# Patient Record
Sex: FEMALE | Race: WHITE | HISPANIC OR LATINO | URBAN - METROPOLITAN AREA
[De-identification: names, ages, dates, MRNs, and addresses within clinical notes are randomized per-mention and may not be internally consistent; named-entity substitution may affect disease eponyms.]

---

## 2017-12-17 ENCOUNTER — HOSPITAL ENCOUNTER (OUTPATIENT)
Facility: HOSPITAL | Age: 62
Discharge: HOME | End: 2017-12-18
Attending: EMERGENCY MEDICINE | Admitting: EMERGENCY MEDICINE

## 2017-12-17 DIAGNOSIS — S06.0XAA CONCUSSION: ICD-10-CM

## 2017-12-17 DIAGNOSIS — N30.00 ACUTE CYSTITIS WITHOUT HEMATURIA: ICD-10-CM

## 2017-12-17 DIAGNOSIS — G93.89 BRAIN MASS: ICD-10-CM

## 2017-12-17 DIAGNOSIS — F07.81 POST CONCUSSIVE SYNDROME: Primary | ICD-10-CM

## 2017-12-17 DIAGNOSIS — M54.50 LOW BACK PAIN: ICD-10-CM

## 2017-12-17 DIAGNOSIS — M54.6 THORACIC BACK PAIN: ICD-10-CM

## 2017-12-17 LAB
ALBUMIN SERPL BCP-MCNC: 3.5 G/DL
ALP SERPL-CCNC: 101 U/L
ALT SERPL W/O P-5'-P-CCNC: 29 U/L
ANION GAP SERPL CALC-SCNC: 9 MMOL/L
AST SERPL-CCNC: 16 U/L
BASOPHILS # BLD AUTO: 0.03 K/UL
BASOPHILS NFR BLD: 0.3 %
BILIRUB SERPL-MCNC: 0.2 MG/DL
BUN SERPL-MCNC: 16 MG/DL
CALCIUM SERPL-MCNC: 9.1 MG/DL
CHLORIDE SERPL-SCNC: 105 MMOL/L
CO2 SERPL-SCNC: 24 MMOL/L
CREAT SERPL-MCNC: 0.7 MG/DL
DIFFERENTIAL METHOD: NORMAL
EOSINOPHIL # BLD AUTO: 0.1 K/UL
EOSINOPHIL NFR BLD: 1 %
ERYTHROCYTE [DISTWIDTH] IN BLOOD BY AUTOMATED COUNT: 13.6 %
EST. GFR  (AFRICAN AMERICAN): >60 ML/MIN/1.73 M^2
EST. GFR  (NON AFRICAN AMERICAN): >60 ML/MIN/1.73 M^2
GLUCOSE SERPL-MCNC: 98 MG/DL
HCT VFR BLD AUTO: 42 %
HGB BLD-MCNC: 14.6 G/DL
INR PPP: 0.9
LYMPHOCYTES # BLD AUTO: 2.9 K/UL
LYMPHOCYTES NFR BLD: 26.6 %
MCH RBC QN AUTO: 30 PG
MCHC RBC AUTO-ENTMCNC: 34.8 G/DL
MCV RBC AUTO: 86 FL
MONOCYTES # BLD AUTO: 0.7 K/UL
MONOCYTES NFR BLD: 6.2 %
NEUTROPHILS # BLD AUTO: 7.3 K/UL
NEUTROPHILS NFR BLD: 65.9 %
PLATELET # BLD AUTO: 293 K/UL
PMV BLD AUTO: 10.8 FL
POTASSIUM SERPL-SCNC: 4 MMOL/L
PROT SERPL-MCNC: 7.5 G/DL
PROTHROMBIN TIME: 10 SEC
RBC # BLD AUTO: 4.87 M/UL
SODIUM SERPL-SCNC: 138 MMOL/L
WBC # BLD AUTO: 10.99 K/UL

## 2017-12-17 PROCEDURE — 85610 PROTHROMBIN TIME: CPT

## 2017-12-17 PROCEDURE — 99285 EMERGENCY DEPT VISIT HI MDM: CPT | Mod: 25

## 2017-12-17 PROCEDURE — G0378 HOSPITAL OBSERVATION PER HR: HCPCS

## 2017-12-17 PROCEDURE — 96375 TX/PRO/DX INJ NEW DRUG ADDON: CPT

## 2017-12-17 PROCEDURE — 85025 COMPLETE CBC W/AUTO DIFF WBC: CPT

## 2017-12-17 PROCEDURE — 25500020 PHARM REV CODE 255: Performed by: EMERGENCY MEDICINE

## 2017-12-17 PROCEDURE — A4216 STERILE WATER/SALINE, 10 ML: HCPCS | Performed by: EMERGENCY MEDICINE

## 2017-12-17 PROCEDURE — 80053 COMPREHEN METABOLIC PANEL: CPT

## 2017-12-17 PROCEDURE — 25000003 PHARM REV CODE 250: Performed by: EMERGENCY MEDICINE

## 2017-12-17 PROCEDURE — 63600175 PHARM REV CODE 636 W HCPCS: Performed by: EMERGENCY MEDICINE

## 2017-12-17 PROCEDURE — A9585 GADOBUTROL INJECTION: HCPCS | Performed by: EMERGENCY MEDICINE

## 2017-12-17 PROCEDURE — 96374 THER/PROPH/DIAG INJ IV PUSH: CPT

## 2017-12-17 RX ORDER — GADOBUTROL 604.72 MG/ML
7.5 INJECTION INTRAVENOUS
Status: COMPLETED | OUTPATIENT
Start: 2017-12-17 | End: 2017-12-17

## 2017-12-17 RX ORDER — PANTOPRAZOLE SODIUM 40 MG/1
40 TABLET, DELAYED RELEASE ORAL DAILY
Status: DISCONTINUED | OUTPATIENT
Start: 2017-12-18 | End: 2017-12-18 | Stop reason: HOSPADM

## 2017-12-17 RX ORDER — MORPHINE SULFATE 8 MG/ML
4 INJECTION INTRAMUSCULAR; INTRAVENOUS; SUBCUTANEOUS
Status: COMPLETED | OUTPATIENT
Start: 2017-12-17 | End: 2017-12-17

## 2017-12-17 RX ORDER — ACETAMINOPHEN 325 MG/1
650 TABLET ORAL EVERY 8 HOURS PRN
Status: DISCONTINUED | OUTPATIENT
Start: 2017-12-17 | End: 2017-12-18 | Stop reason: HOSPADM

## 2017-12-17 RX ORDER — ONDANSETRON 2 MG/ML
4 INJECTION INTRAMUSCULAR; INTRAVENOUS
Status: COMPLETED | OUTPATIENT
Start: 2017-12-17 | End: 2017-12-17

## 2017-12-17 RX ORDER — SODIUM CHLORIDE 0.9 % (FLUSH) 0.9 %
3 SYRINGE (ML) INJECTION EVERY 8 HOURS
Status: DISCONTINUED | OUTPATIENT
Start: 2017-12-17 | End: 2017-12-18 | Stop reason: HOSPADM

## 2017-12-17 RX ORDER — OXYCODONE HYDROCHLORIDE 5 MG/1
5 TABLET ORAL EVERY 4 HOURS PRN
Status: DISCONTINUED | OUTPATIENT
Start: 2017-12-17 | End: 2017-12-18 | Stop reason: HOSPADM

## 2017-12-17 RX ADMIN — GADOBUTROL 7.5 ML: 604.72 INJECTION INTRAVENOUS at 06:12

## 2017-12-17 RX ADMIN — SODIUM CHLORIDE, PRESERVATIVE FREE 3 ML: 5 INJECTION INTRAVENOUS at 10:12

## 2017-12-17 RX ADMIN — OXYCODONE HYDROCHLORIDE 5 MG: 5 TABLET ORAL at 08:12

## 2017-12-17 RX ADMIN — MORPHINE SULFATE 4 MG: 8 INJECTION, SOLUTION INTRAMUSCULAR; INTRAVENOUS at 02:12

## 2017-12-17 RX ADMIN — ONDANSETRON 4 MG: 2 INJECTION, SOLUTION INTRAMUSCULAR; INTRAVENOUS at 02:12

## 2017-12-17 NOTE — ED PROVIDER NOTES
Encounter Date: 2017    SCRIBE #1 NOTE: I, Marianna Wilburn, am scribing for, and in the presence of,  Demetrius Markham MD. I have scribed the following portions of the note - Other sections scribed: HPI and ROS.       History     Chief Complaint   Patient presents with    Fall     slip and fall. Hit head; EMS reports pt does not take any blood thinners     CC: Fall    HPI: This 62 y.o. Female with PMHx of gastritis, high cholesterol, abdominal hernia, and  section presents to the ED for an evaluation following a fall that occurred approximately 2 hours ago. Pt states she slipped on water and her head hit a rock. Pt reports LOC subsequent to the fall. Pt states she did not know where she was and who she was with following the fall. Pt reports nausea, severe back pain, severe neck pain, severe headache, and weakness to hands and feet following the fall. Pt denies a hx of DM. Pt also denies any other associated symptoms.       The history is provided by the patient. The history is limited by a language barrier. A  was used ([place] ([place])).     Review of patient's allergies indicates:   Allergen Reactions    Penicillins Hives     Past Medical History:   Diagnosis Date    Abdominal hernia     Arthritis     Constipation     Gastritis     High cholesterol     History of  section     UTI (urinary tract infection)      Past Surgical History:   Procedure Laterality Date    TUBAL LIGATION       History reviewed. No pertinent family history.  Social History   Substance Use Topics    Smoking status: Never Smoker    Smokeless tobacco: Never Used    Alcohol use No     Review of Systems   Constitutional: Negative for chills, diaphoresis and fever.   HENT: Negative for ear pain and sore throat.         (+) Head trauma   Eyes: Negative for pain.   Respiratory: Negative for cough and shortness of breath.    Cardiovascular: Negative for chest pain.   Gastrointestinal: Positive  for nausea. Negative for abdominal pain, diarrhea and vomiting.   Genitourinary: Negative for dysuria.   Musculoskeletal: Positive for back pain (severe) and neck pain (severe).   Skin: Negative for rash.   Neurological: Positive for weakness (to hands and feet ) and headaches (severe).        (+) LOC   Psychiatric/Behavioral: Positive for confusion.       Physical Exam     Initial Vitals [12/17/17 1344]   BP Pulse Resp Temp SpO2   (!) 153/86 89 18 98.3 °F (36.8 °C) 95 %      MAP       108.33         Physical Exam    Nursing note and vitals reviewed.  Constitutional: She appears well-developed and well-nourished. No distress.   Eyes: EOM are normal. Pupils are equal, round, and reactive to light.   Neck: Normal range of motion. Neck supple. No thyromegaly present. No JVD present.   Cardiovascular: Normal rate, regular rhythm, normal heart sounds and intact distal pulses. Exam reveals no gallop and no friction rub.    No murmur heard.  Pulmonary/Chest: Breath sounds normal. No respiratory distress. She has no wheezes. She has no rhonchi. She has no rales. She exhibits no tenderness.   Abdominal: Soft. Bowel sounds are normal. She exhibits no distension. There is no tenderness. There is no rebound and no guarding.   Musculoskeletal: Normal range of motion. She exhibits no edema or tenderness.   Neurological: She is alert and oriented to person, place, and time. She has normal strength. She displays normal reflexes. No cranial nerve deficit or sensory deficit.   Skin: Skin is warm and dry.         ED Course   Procedures  Labs Reviewed   CBC W/ AUTO DIFFERENTIAL   COMPREHENSIVE METABOLIC PANEL   PROTIME-INR        Patient had weakness and fall. Found to have a UTI. Also a lesion in the brain on ct head. Will obs as syncope vs seizure vs mechanical fall is not certain. Will obtain MRI brain to further assess. Discussed with NP on Obs service.                 Scribe Attestation:   Scribe #1: I performed the above scribed  service and the documentation accurately describes the services I performed. I attest to the accuracy of the note.    Attending Attestation:           Physician Attestation for Scribe:  Physician Attestation Statement for Scribe #1: I, Demetrius Markham MD, reviewed documentation, as scribed by Marianna Wilburn in my presence, and it is both accurate and complete.                 ED Course      Clinical Impression:   The primary encounter diagnosis was Post concussive syndrome. Diagnoses of Low back pain, Thoracic back pain, Concussion, Brain mass, and Acute cystitis without hematuria were also pertinent to this visit.                           Demetrius Markham MD  01/17/18 9403

## 2017-12-17 NOTE — ED TRIAGE NOTES
Family states that pt. Is visiting, pt. Slipped in the door way and hit her head on a rock. Family reports that pt. Did lose consciousness for about 40 minutes, pt. Wasn't responding to voice our touch. Pt. Reports she has a headache, pt. States she did not remember what happened, she just remembers having the headache after the fall. Pt. Reports she is experiencing some nausea but no vomiting. Pt. Is awake and alert, and in no acute distress. Family at the bedside.

## 2017-12-18 VITALS
DIASTOLIC BLOOD PRESSURE: 73 MMHG | BODY MASS INDEX: 32.3 KG/M2 | SYSTOLIC BLOOD PRESSURE: 116 MMHG | RESPIRATION RATE: 18 BRPM | OXYGEN SATURATION: 93 % | WEIGHT: 182.31 LBS | HEART RATE: 77 BPM | TEMPERATURE: 98 F | HEIGHT: 63 IN

## 2017-12-18 LAB
BACTERIA #/AREA URNS HPF: ABNORMAL /HPF
BILIRUB UR QL STRIP: NEGATIVE
CLARITY UR: CLEAR
COLOR UR: YELLOW
GLUCOSE UR QL STRIP: NEGATIVE
HGB UR QL STRIP: NEGATIVE
KETONES UR QL STRIP: NEGATIVE
LEUKOCYTE ESTERASE UR QL STRIP: ABNORMAL
MICROSCOPIC COMMENT: ABNORMAL
NITRITE UR QL STRIP: POSITIVE
PH UR STRIP: 5 [PH] (ref 5–8)
PROT UR QL STRIP: NEGATIVE
SP GR UR STRIP: 1.02 (ref 1–1.03)
URN SPEC COLLECT METH UR: ABNORMAL
UROBILINOGEN UR STRIP-ACNC: NEGATIVE EU/DL
WBC #/AREA URNS HPF: 10 /HPF (ref 0–5)

## 2017-12-18 PROCEDURE — G0378 HOSPITAL OBSERVATION PER HR: HCPCS

## 2017-12-18 PROCEDURE — 99218 PR INITIAL OBSERVATION CARE,LEVL I: CPT | Mod: ,,, | Performed by: PSYCHIATRY & NEUROLOGY

## 2017-12-18 PROCEDURE — 81000 URINALYSIS NONAUTO W/SCOPE: CPT

## 2017-12-18 PROCEDURE — A4216 STERILE WATER/SALINE, 10 ML: HCPCS | Performed by: EMERGENCY MEDICINE

## 2017-12-18 PROCEDURE — 25000003 PHARM REV CODE 250: Performed by: EMERGENCY MEDICINE

## 2017-12-18 RX ORDER — CIPROFLOXACIN 500 MG/1
500 TABLET ORAL EVERY 12 HOURS
Qty: 14 TABLET | Refills: 0 | Status: SHIPPED | OUTPATIENT
Start: 2017-12-18 | End: 2017-12-25

## 2017-12-18 RX ADMIN — SODIUM CHLORIDE, PRESERVATIVE FREE 3 ML: 5 INJECTION INTRAVENOUS at 05:12

## 2017-12-18 RX ADMIN — ACETAMINOPHEN 650 MG: 325 TABLET ORAL at 09:12

## 2017-12-18 RX ADMIN — OXYCODONE HYDROCHLORIDE 5 MG: 5 TABLET ORAL at 06:12

## 2017-12-18 RX ADMIN — PANTOPRAZOLE SODIUM 40 MG: 40 TABLET, DELAYED RELEASE ORAL at 09:12

## 2017-12-18 NOTE — HPI
PHIL Andrews is a Urdu speaking 62 y.o. female, history and assessment obtained through the language line, with HLD presented to the ED for evaluation of a fall. Patient reports she [place] the country [place] her daughter when she slipped and fell backwards hitting her head on a rock. Se further report when she woke up her head was hurting. She states she was out ~ .5 hour, family kept her in the house for about an 1 hour before bringing her to the hospital because she states they did not believe she fell, but someone witnessed the fall, that's when they decided to bring her to the emergency room. She denies chest pain, abdominal pain, NVD, focal deficit, seizures, gait abnormalities, dizziness, lightheadedness or any other associated symptoms. In the ED, CT head completed and showed extracranial soft tissue swelling without evidence of underlying fracture or acute intracranial hemorrhage; 1.5 cm low-density lesion in the frontal horn the left lateral ventricle. CT cervical spine without acute findings. Xray lumbar spine shows L5 pars defects with grade 1 anterolisthesis of line 5 on S1 with severe DJD at that level. CXR without acute findings. MRI brain shows 1.6 x 0.8 cm T1 hypointense/T2 hyperintense nonenhancing lesion in the frontal horn of the left lateral ventricle with minimal edema of the surrounding periventricular white matter; no obstructive signs. MRI cervical spine without acute findings. CBC and chemistry unremarkable. Admitted to OBS for overnight monitoring

## 2017-12-18 NOTE — HOSPITAL COURSE
Patient presents s/p traumatic fall with post concussive syndrome. Initially confused following incident. CT revealing soft tissue swelling. Placed in observation overnight for neuro checks and neurology consult. MRI rom and C-spine with no acute abnormalities. On MRI brain there was a small mass projecting to left lateral ventricle. This is a chronic finding of which patient already aware and per neurology for now seems to be of no danger (see MRI official report). She will need to follow for serial imaging yearly upon returning to [place] which was discussed with patient. This am she was with complaint of dysuria and odor thus UA obtained revealing nitrite positive UTI. She has history of recurrent UTI following perforated bladder with c section years ago. Will start on cipro x7 days. Patient to discharge to home today with family.

## 2017-12-18 NOTE — PLAN OF CARE
12/18/17 1332   Final Note   Assessment Type Final Discharge Note   Discharge Disposition Home   What phone number can be called within the next 1-3 days to see how you are doing after discharge? ([phone])   Hospital Follow Up  Appt(s) scheduled? No  (daughter to schedule)   Discharge plans and expectations educations in teach back method with documentation complete? Yes   Right Care Referral Info   Post Acute Recommendation No Care   Patient stated that daughter takes her to a clinic called [facility] and that is where they get her Rx filled also.  She stated that daughter will schedule appointment.    NurseAtul notified all CM needs are met.

## 2017-12-18 NOTE — PLAN OF CARE
"Assessment completed using language line  [id number]    TN to patient's room to discuss Helping the patient manage care at home.   TN/MATTI roll explained to pt.  TN's name and contact info placed on white board.  Pt/family encouraged to call for any problems/concerns with DC. "Discharge planning begins on Admission" pamphlet discussed and placed in "My Health Packet" and placed at bedside.     Preferred Appointment time / days: Daughter to schedule  Preferred pharmacy: Uses pharmacy at [facility]       12/18/17 1207   Discharge Assessment   Assessment Type Discharge Planning Assessment   Confirmed/corrected address and phone number on facesheet? Yes   Assessment information obtained from? Patient   Expected Length of Stay (days) (today)   Communicated expected length of stay with patient/caregiver yes   Prior to hospitilization cognitive status: Alert/Oriented   Prior to hospitalization functional status: Independent   Current cognitive status: Alert/Oriented   Current Functional Status: Needs Assistance   Lives With child(yajaira), adult   Able to Return to Prior Arrangements yes   Is patient able to care for self after discharge? Yes   Who are your caregiver(s) and their phone number(s)? n/a daughter able to help at home   Patient's perception of discharge disposition home or selfcare   Readmission Within The Last 30 Days no previous admission in last 30 days   Patient currently being followed by outpatient case management? No   Patient currently receives any other outside agency services? No   Equipment Currently Used at Home none   Do you have any problems affording any of your prescribed medications? No   Is the patient taking medications as prescribed? yes   Does the patient have transportation home? Yes   Transportation Available family or friend will provide   Does the patient receive services at the [facility]? No   Discharge Plan A Home with family   Discharge Plan B (n/a)   Patient/Family In Agreement With " Plan yes   Does the patient have transportation to healthcare appointments? Yes

## 2017-12-18 NOTE — NURSING
Patient arrived to unit via stretcher. She is awake, alert. Steady gait noted with ambulation. No family at bedside. Respirations even, unlabored. No distress noted. VS noted in flow sheet. Jerrod FNP notified patient arrived to unit. Safety and fall precautions continued.

## 2017-12-18 NOTE — DISCHARGE INSTRUCTIONS
Infección De La Vejiga,Stacia [Bladder Infection: Female, Adult]    Augusto infección de la vejiga (cistitis [cystitis - UTI]) suele provocar constantes deseos de orinar y ardor al orinar. Es posible que la orina se violette turbia u oscura, o que tenga olor bing. Puede araseli dolor en la parte baja del abdomen. Augusto infección de la vejiga se produce cuando las bacterias del área vaginal ingresan al orificio donde desemboca la vejiga (la uretra [urethra]). Puede ocurrir después de araseli tenido relaciones sexuales, por usar ropas muy ajustadas, por deshidratación y otros factores.  Cuidados En La [place]  · Mirtha abundante líquido (al menos, entre 6 y 8 vasos por día, excepto que le hayan indicado limitar los líquidos por otras razones médicas). Eso hará que el medicamento ingrese mejor al sistema urinario y arrastrará las bacterias fuera de garcia cuerpo.  · Evite tener relaciones sexuales hasta que los síntomas hayan desaparecido.  · No consuma cafeína, alcohol ni comidas muy condimentadas, ya que pueden irritar la vejiga.  · Augusto infección de la vejiga (bladder infection) se trata con antibióticos (antibiotics). También es posible que le receten Pyridum (nombre genérico: fenazopiridina [phenazopyridine]) para aliviar el ardor. Cinthya medicamento hará que garcia orina sea de color naranja brillante. Es posible que gil orina de color naranja le manche la ropa. Puede usar un protector diario o augusto toalla femenina para proteger la ropa.  Evite Futuras Infecciones  · Después de evacuar el intestino, siempre límpiese con un movimiento de adelante hacia atrás.  · Mantenga la braxton genital limpia y seca.  · Mirtha mucho líquidos todos los días para evitar la deshidratación (dehydration).  · Ambos integrantes de la suzanna deben lavarse antes del acto sexual.  · Orine mary después del acto sexual para limpiar la vejiga.  · Use ropa interior de algodón y pantimedias con recubrimiento de algodón. Evite usar pantalones muy ajustados.  · Si está  tomando píldoras anticonceptivas y tiene frecuentes infecciones de vejiga, háblelo con garcia médico.  Visita De Control  Visite a garcia médico si no cleaning desparecido todos los síntomas después de tamiko días de tratamiento.  Busque Prontamente Atención Médica  si algo de lo siguiente ocurre:  · Fiebre de 100.4°F (38°C) o más boston, o mona le haya indicado garcia proveedor de atención médica.  · No hay mejoría después de tamiko días de tratamiento.  · Mayor dolor en la espalda o el abdomen.  · Vómito persistente (no puede mantener el medicamento en el estómago).  · Debilidad, mareo o desmayo.  · Secreción vaginal.  · Dolor, enrojecimiento o hinchazón en los labios vaginales (braxton exterior de la vagina).  Date Last Reviewed: 4/8/2014  © 8872-8928 [employer]. [address] [place] [address], [place] [address]. Todos los derechos reservados. Esta información no pretende sustituir la atención médica profesional. Sólo garcia médico puede diagnosticar y tratar un problema de zach.

## 2017-12-18 NOTE — ASSESSMENT & PLAN NOTE
See HPI for history and imaging results.  Fall with LOC. Plan for neuro checks. Fall precautions. Neurology consulted. Continue to monitor. PT to eval and treat.

## 2017-12-18 NOTE — DISCHARGE SUMMARY
[facility] Hospital Medicine  Discharge Summary      Patient Name: PHIL Andrews  MRN: [id number]  Admission Date: 12/17/2017  Hospital Length of Stay: 0 days  Discharge Date and Time:  12/18/2017 11:59 AM  Attending Physician: Yonis Johnson MD   Discharging Provider: Shannon Castaneda PA-C  Primary Care Provider: No primary care provider on file.      HPI:   PHIL Andrews is a Lao speaking 62 y.o. female, history and assessment obtained through the language line, with HLD presented to the ED for evaluation of a fall. Patient reports she just came to the country to visit her daughter when she slipped and fell backwards hitting her head on a rock. Se further report when she woke up her head was hurting. She states she was out ~ .5 hour, family kept her in the house for about an 1 hour before bringing her to the hospital because she states they did not believe she fell, but someone witnessed the fall, that's when they decided to bring her to the emergency room. She denies chest pain, abdominal pain, NVD, focal deficit, seizures, gait abnormalities, dizziness, lightheadedness or any other associated symptoms. In the ED, CT head completed and showed extracranial soft tissue swelling without evidence of underlying fracture or acute intracranial hemorrhage; 1.5 cm low-density lesion in the frontal horn the left lateral ventricle. CT cervical spine without acute findings. Xray lumbar spine shows L5 pars defects with grade 1 anterolisthesis of line 5 on S1 with severe DJD at that level. CXR without acute findings. MRI brain shows 1.6 x 0.8 cm T1 hypointense/T2 hyperintense nonenhancing lesion in the frontal horn of the left lateral ventricle with minimal edema of the surrounding periventricular white matter; no obstructive signs. MRI cervical spine without acute findings. CBC and chemistry unremarkable. Admitted to OBS for overnight monitoring         * No surgery found *       Hospital Course:   Patient presents s/p traumatic fall with post concussive syndrome. Initially confused following incident. CT revealing soft tissue swelling. Placed in observation overnight for neuro checks and neurology consult. MRI rom and C-spine with no acute abnormalities. On MRI brain there was a small mass projecting to left lateral ventricle. This is a chronic finding of which patient already aware and per neurology for now seems to be of no danger (see MRI official report). She will need to follow for serial imaging yearly upon returning to [place] which was discussed with patient. This am she was with complaint of dysuria and odor thus UA obtained revealing nitrite positive UTI. She has history of recurrent UTI following perforated bladder with c section years ago. Will start on cipro x7 days. Patient to discharge to home today with family.      Consults:   Consults         Status Ordering Provider     Inpatient consult to Neurology  Once     Provider:  Scotty May MD    Completed MACIE KAM          No new Assessment & Plan notes have been filed under this hospital service since the last note was generated.  Service: Hospital Medicine    Final Active Diagnoses:    Diagnosis Date Noted POA    PRINCIPAL PROBLEM:  Post concussive syndrome [F07.81] 12/17/2017 Yes      Problems Resolved During this Admission:    Diagnosis Date Noted Date Resolved POA       Discharged Condition: good    Disposition: Home or Self Care    Follow Up:    Patient Instructions:     Diet general     Activity as tolerated         Significant Diagnostic Studies: Labs:   CMP   Recent Labs  Lab 12/17/17  1439      K 4.0      CO2 24   GLU 98   BUN 16   CREATININE 0.7   CALCIUM 9.1   PROT 7.5   ALBUMIN 3.5   BILITOT 0.2   ALKPHOS 101   AST 16   ALT 29   ANIONGAP 9   ESTGFRAFRICA >60   EGFRNONAA >60   , CBC   Recent Labs  Lab 12/17/17  1439   WBC 10.99   HGB 14.6   HCT 42.0        Imaging Results           MRI Brain W WO Contrast (Final result)  Result time 12/17/17 19:33:40    Final result by Logan Hopper MD (12/17/17 19:33:40)                 Impression:       1.6 x 0.8 cm T1 hypointense/T2 hyperintense nonenhancing lesion in the frontal horn of the left lateral ventricle with minimal edema of the surrounding periventricular white matter. No obstructive signs. This is most suggestive of a subependymoma.      Changes of chronic small vessel ischemic disease and mild cerebral volume loss.    No acute intracranial process.          Electronically signed by: LOGAN HOPPER MD  Date:     12/17/17  Time:    19:33              Narrative:    Exam: [id number]  12/17/17  17:02:32 [id number] ([facility]) : MRI BRAIN W WO CONTRAST    Technique:    Multiplanar multisequence MRI of the brain was performed without and with intravenous contrast.  The patient received 7.5 cc of gadavist IV.    Comparison:    CT scan of the head dated 12/17/2017.    Findings:      The craniocervical junction is within normal limits.  The intracranial flow voids are unremarkable.    No diffusion-weighted signal abnormality is present.    There is increased T2/FLAIR signal within the periventricular and subcortical white matter.      There is a 1.6 x 0.8 cm T1 hypointense/T2 hyperintense nonenhancing lesion in the frontal horn of the left lateral ventricle.  No evidence of diffusion restriction is identified with this lesion.  The lesion demonstrated T2/FLAIR hyperintensity.  There is minimal edema of the adjacent periventricular white matter and the left caudate head.      The ventricles and sulci otherwise unremarkable.  No extra-axial fluid collections are identified.  There is no evidence of intracranial hemorrhage.    The orbits and intraorbital contents are within normal limits.  The paranasal sinuses and mastoid air cells are clear.    There is no evidence of abnormal enhancement following intravenous contrast administration.                              MRI Cervical Spine W WO Cont (Final result)  Result time 12/17/17 19:33:13    Final result by Logan Hopper MD (12/17/17 19:33:13)                 Impression:       1.  No evidence of metastatic disease or drop metastasis in the cervical spine.    2.  No evidence of ligamentous injury or acute fracture of the cervical spine.    3.  No significant degenerative changes in the cervical spine.            Electronically signed by: LOGAN HOPPER MD  Date:     12/17/17  Time:    19:33              Narrative:    Exam: [id number]  12/17/17  17:03:28 [id number] ([facility]) : MRI CERVICAL SPINE W WO CONTRAST    Technique:    Multiplanar and multisequence MRI of the cervical spine was performed without and with intravenous contrast.  The patient received 7.5 cc of Gadavist IV.    Comparison:    CT scan of the cervical spine dated 12/17/2017    Findings:      The visualized portions of the posterior fossa are within normal limits.  The craniocervical junction is within normal limits.    The cervical alignment is within normal limits.  The vertebral body heights are maintained.  There are scattered hemangiomas in the C2 and C6 vertebral bodies.  The remaining marrow signal is within normal limits.    The cord is normal in signal without evidence of edema or expansion.  There are no intraspinal collections.    There is minimal disc desiccation.  Evaluation of individual disc levels reveal minimal disc protrusions at the C5-C6 and C6-C7 levels.  There is no evidence of spinal canal or neural foraminal narrowing.    The paraspinal soft tissues are unremarkable.     There is no evidence of abnormal enhancement following intravenous contrast enhancement.                             X-Ray Lumbar Spine Ap And Lateral (Final result)  Result time 12/17/17 15:43:09    Final result by Torito Cervantes III, MD (12/17/17 15:43:09)                 Narrative:    There are L5 pars defects with grade 1 anterolisthesis of line 5 on S1 with severe DJD at that  level. This mild DJD of levels above this. No other abnormality seen.      Electronically signed by: BOBBY DIXON MD  Date:     12/17/17  Time:    15:43                              X-Ray Chest PA And Lateral (Final result)  Result time 12/17/17 15:51:02    Final result by Azul Clay MD (12/17/17 15:51:02)                 Impression:     No acute cardiopulmonary disease      Electronically signed by: AZUL CLAY MD  Date:     12/17/17  Time:    15:51              Narrative:    PA and lateral views of the chest were obtained.  Comparison -- none. The heart size is borderline enlarged. Mediastinal contour is unremarkable. Lungs are expanded and clear. No lung consolidation, pleural effusion, pneumothorax is seen. The skeletal structures are intact.                             CT Cervical Spine Without Contrast (Final result)  Result time 12/17/17 14:55:44    Final result by Kristian Woodard MD (12/17/17 14:55:44)                 Impression:        No fracture or traumatic malalignment of the cervical spine.      Electronically signed by: KRISTIAN WOODARD MD  Date:     12/17/17  Time:    14:55              Narrative:    CT cervical spine    12/17/17 14:33:49    Accession# [id number]    CLINICAL INDICATION: 62 year old F with neck pain     TECHNIQUE:   Axial CT images obtained throughout the region of the cervical spine without intravenous contrast. Axial, sagittal, and coronal reconstructions were performed.    COMPARISON: No priors    FINDINGS:     The vertebral bodies are normal in height and morphology without evidence of fracture or osseous destructive process.  Normal sagittal alignment is preserved.    Mild degenerative changes without evidence of bony spinal canal stenosis or high grade neuroforaminal narrowing.  Intervertebral disk heights are well maintained.    Limited evaluation of the intraspinal contents demonstrates no hematoma or mass.Paraspinal soft tissues exhibit no acute abnormalities.                              CT Head Without Contrast (Final result)     Abnormal  Result time 12/17/17 14:11:19    Final result by Kristian Huang MD (12/17/17 14:11:19)                 Impression:         Extracranial soft tissue swelling without evidence of underlying fracture or acute intracranial hemorrhage.    1.5 cm low-density lesion in the frontal horn the left lateral ventricle. Recommend further evaluation with contrast-enhanced MRI; neoplasm such as subependymoma to be considered.    Epic notification system activated.       Electronically signed by: KRISTIAN HUANG MD  Date:     12/17/17  Time:    14:11              Narrative:    CT head without contrast    12/17/17 13:54:53    Accession# [id number]    CLINICAL INDICATION: 62 year old F with occipital hematoma, fall     TECHNIQUE: Axial CT images obtained throughout the region of the head without the use of intravenous contrast. Axial, sagittal and coronal reconstructions were performed.    COMPARISON: No priors.    FINDINGS:    The ventricles are normal in size without evidence of hydrocephalus. 1.5 x 0.9 cm low density rounded lesion in the frontal horn of left lateral ventricle near the foramen of Perez. No associated ventricular trapping or dilatation. No surrounding edema. No significant mass effect.    The brain appears within normal limits. No parenchymal mass, hemorrhage, edema or major vascular distribution infarct.    No extra-axial blood or fluid collections.    Posterior right parietal extracranial soft tissue swelling. No subjacent fracture. The cranium appears intact. Mastoid air cells and paranasal sinuses are essentially clear.                                Pending Diagnostic Studies:     None         Medications:  Reconciled Home Medications:   Current Discharge Medication List      START taking these medications    Details   ciprofloxacin HCl (CIPRO) 500 MG tablet Take 1 tablet (500 mg total) by mouth every 12 (twelve) hours.  Qty: 14  tablet, Refills: 0             Indwelling Lines/Drains at time of discharge:   Lines/Drains/Airways          No matching active lines, drains, or airways          Time spent on the discharge of patient: less than thirty minutes  Patient was seen and examined on the date of discharge and determined to be suitable for discharge.         Shannon Castaneda PA-C  Hospitalist-Department of Hospital Medicine  [address] [place] [address]  Office [phone] Pager [phone]

## 2017-12-18 NOTE — CONSULTS
[facility]  Neurology  Consult Note    Patient Name: PHIL Andrews  MRN: [id number]  Admission Date: 2017  Hospital Length of Stay: 0 days  Code Status: Full Code   Attending Provider: Yonis Johnson MD   Consulting Provider: Scotty May MD  Primary Care Physician: No primary care provider on file.  Principal Problem:Post concussive syndrome    Consults  Subjective:     Chief Complaint/Reason for consult: Concussion     HPI: 61 y/o female with medical Hx as listed below was brought to ED after falling at home and sustaining head trauma. Pt states that she slipped as the floor was wet and fell backwards. Pt was unconscious for approx 30 minutes; upon regaining awareness she was very confused. She wanted to talk to people but words wouldn't come out. Ms. Dillon states that she was in a haze for several hours before she was able to interact appropriately. There was concerned of spinal cord trauma as pt had partial movement of UE's. She does complain of a headache mostly in occipital area but no weakness, numbness, vertigo, visual or speech disturbances.     Past Medical History:   Diagnosis Date    Abdominal hernia     Arthritis     Constipation     Gastritis     High cholesterol     History of  section     UTI (urinary tract infection)        Past Surgical History:   Procedure Laterality Date    TUBAL LIGATION         Review of patient's allergies indicates:   Allergen Reactions    Penicillins Hives and Itching       Current Neurological Medications:     No current facility-administered medications on file prior to encounter.      No current outpatient prescriptions on file prior to encounter.      Family History     None        Social History Main Topics    Smoking status: Never Smoker    Smokeless tobacco: Never Used    Alcohol use No    Drug use: No    Sexual activity: Not Currently     Review of Systems   Constitutional: Negative for fever and  unexpected weight change.   HENT: Negative for trouble swallowing and voice change.    Eyes: Negative for photophobia.   Respiratory: Negative for shortness of breath.    Cardiovascular: Negative for chest pain and palpitations.   Gastrointestinal: Negative for abdominal pain.   Endocrine: Negative for polydipsia and polyphagia.   Genitourinary: Negative for difficulty urinating.   Neurological: Positive for headaches. Negative for dizziness, tremors, seizures, syncope, facial asymmetry, speech difficulty, weakness, light-headedness and numbness.   Psychiatric/Behavioral: Negative for hallucinations.          Objective:     Vital Signs (Most Recent):  Temp: 98 °F (36.7 °C) (12/18/17 0740)  Pulse: 74 (12/18/17 0740)  Resp: 20 (12/18/17 0740)  BP: 124/73 (12/18/17 0740)  SpO2: (!) 91 % (12/18/17 0740) Vital Signs (24h Range):  Temp:  [97.5 °F (36.4 °C)-98.9 °F (37.2 °C)] 98 °F (36.7 °C)  Pulse:  [74-99] 74  Resp:  [16-20] 20  SpO2:  [91 %-97 %] 91 %  BP: (108-153)/(60-89) 124/73     Weight: 82.7 kg (182 lb 5.1 oz)  Body mass index is 32.3 kg/m².    Physical Exam   Constitutional: She is oriented to person, place, and time. She appears well-developed and well-nourished. No distress.   HENT:   Head: Normocephalic and atraumatic.   Eyes: Conjunctivae and EOM are normal. Pupils are equal, round, and reactive to light. No scleral icterus.   Neck:   Pain on palpation of muscles   Cardiovascular: Normal rate and regular rhythm.    Pulmonary/Chest: Effort normal and breath sounds normal.   Abdominal: Bowel sounds are normal.   Musculoskeletal: She exhibits no edema or deformity.   Neurological: She is oriented to person, place, and time. She has normal strength. She has a normal Finger-Nose-Finger Test. Gait normal.   Skin: She is not diaphoretic.   Psychiatric: Her speech is normal.       NEUROLOGICAL EXAMINATION:     MENTAL STATUS   Oriented to person, place, and time.   Attention: normal. Concentration: normal.   Speech:  speech is normal   Level of consciousness: alert    CRANIAL NERVES     CN III, IV, VI   Pupils are equal, round, and reactive to light.  Extraocular motions are normal.   Right pupil: Size: 3 mm. Shape: regular. Reactivity: brisk.   Left pupil: Size: 3 mm. Shape: regular. Reactivity: brisk.     CN V   Right facial sensation deficit: none  Left facial sensation deficit: none    CN VII   Right facial weakness: none  Left facial weakness: none    CN IX, X   CN IX normal.   CN X normal.     CN XI   CN XI normal.     CN XII   CN XII normal.     MOTOR EXAM     Strength   Strength 5/5 throughout.     GAIT AND COORDINATION     Gait  Gait: normal     Coordination   Finger to nose coordination: normal    Tremor   Resting tremor: absent  Intention tremor: absent  Action tremor: absent      Significant Labs:   CBC:   Recent Labs  Lab 12/17/17  1439   WBC 10.99   HGB 14.6   HCT 42.0        CMP:   Recent Labs  Lab 12/17/17  1439   GLU 98      K 4.0      CO2 24   BUN 16   CREATININE 0.7   CALCIUM 9.1   PROT 7.5   ALBUMIN 3.5   BILITOT 0.2   ALKPHOS 101   AST 16   ALT 29   ANIONGAP 9   EGFRNONAA >60       Significant Imaging:  MRI brain  1.6 x 0.8 cm T1 hypointense/T2 hyperintense nonenhancing lesion in the frontal horn of the left lateral ventricle with minimal edema of the surrounding periventricular white matter. No obstructive signs. This is most suggestive of a subependymoma.      Changes of chronic small vessel ischemic disease and mild cerebral volume loss.    No acute intracranial process.          Electronically signed by: DALY HERRERA MD  Date: 12/17/17  Time: 19:33     Assessment and Plan:     61 y/o female S?P fall and head trauma    1. Post-concusion syndrome: pt remained confused for several hours and unable to follow commands but now is at baseline; no focal findings on exam. Admitted for observation.   MRI rom and C-spine with no acute abnormalities. On MRI brain there is a small mass projecting  to left lateral ventricle. This is a chronic finding and for now seems to be of no danger (see MRI official report). She will need to follow for serial imaging perhaps yearly upon returning to [place].   -No other recs. OK to D/C home.    Active Diagnoses:    Diagnosis Date Noted POA    PRINCIPAL PROBLEM:  Post concussive syndrome [F07.81] 12/17/2017 Yes      Problems Resolved During this Admission:    Diagnosis Date Noted Date Resolved POA       VTE Risk Mitigation         Ordered     Low Risk of VTE  Once      12/17/17 1357          Thank you for your consult. I will follow-up with patient. Please contact us if you have any additional questions.    Scotty May MD  Neurology  [facility]

## 2017-12-18 NOTE — PLAN OF CARE
Problem: Patient Care Overview  Goal: Plan of Care Review   12/17/17 1941   Coping/Psychosocial   Plan Of Care Reviewed With patient   Pt remained free of falls during current shift. Reported having pain to back after falling earlier and received prn pain medications. Neurology consulted and neuro checks are negative as of now. Encouraged pt to move slowly when exiting bed to restroom. Plan of care and fall precautions reviewed with pt and verbalized understanding. Bed locked, lowered, SR up x2 and call light placed within reach.

## 2017-12-18 NOTE — ED NOTES
Rashid in MRI notified that pt. Will be transferred to floor after MRI. Pt. Belongings sent to MRI with ED tech

## 2017-12-18 NOTE — NURSING
PER handoff received from MARTINE Ferrer RN. Responds to voice is oriented x4, however pt is Micronesian speaking and required line to answer admission questions. Pt reported having pain to back, however pt in no apparent distress. Assessment completed per Doc Flowsheets; reference if needed. Fall and safety precautions maintained. Bed alarm activated and audible. Bed locked in lowest position, with side rails up x2. Call bell and personal items within reach. Will continue to monitor pt for any changes.

## 2017-12-18 NOTE — H&P
[facility] Medicine  History & Physical    Patient Name: PHIL Andrews  MRN: [id number]  Admission Date: 12/17/2017  Attending Physician: Yonis Johnson MD   Primary Care Provider: No primary care provider on file.         Patient information was obtained from patient via language line and ER records.     Subjective:     Principal Problem:Post concussive syndrome    Chief Complaint:   Chief Complaint   Patient presents with    Fall     slip and fall. Hit head; EMS reports pt does not take any blood thinners        HPI: PHIL Andrews is a Armenian speaking 62 y.o. female, history and assessment obtained through the language line, with HLD presented to the ED for evaluation of a fall. Patient reports she just came to the country to [place] her daughter when she slipped and fell backwards hitting her head on a rock. Se further report when she woke up her head was hurting. She states she was out ~ .5 hour, family kept her in the house for about an 1 hour before bringing her to the hospital because she states they did not believe she fell, but someone witnessed the fall, that's when they decided to bring her to the emergency room. She denies chest pain, abdominal pain, NVD, focal deficit, seizures, gait abnormalities, dizziness, lightheadedness or any other associated symptoms. In the ED, CT head completed and showed extracranial soft tissue swelling without evidence of underlying fracture or acute intracranial hemorrhage; 1.5 cm low-density lesion in the frontal horn the left lateral ventricle. CT cervical spine without acute findings. Xray lumbar spine shows L5 pars defects with grade 1 anterolisthesis of line 5 on S1 with severe DJD at that level. CXR without acute findings. MRI brain shows 1.6 x 0.8 cm T1 hypointense/T2 hyperintense nonenhancing lesion in the frontal horn of the left lateral ventricle with minimal edema of the surrounding periventricular white  matter; no obstructive signs. MRI cervical spine without acute findings. CBC and chemistry unremarkable. Admitted to [facility] for overnight monitoring         Past Medical History:   Diagnosis Date    Abdominal hernia     Gastritis     High cholesterol     History of  section        History reviewed. No pertinent surgical history.    Review of patient's allergies indicates:   Allergen Reactions    Penicillins Hives       No current facility-administered medications on file prior to encounter.      No current outpatient prescriptions on file prior to encounter.     Family History     None        Social History Main Topics    Smoking status: Never Smoker    Smokeless tobacco: Never Used    Alcohol use No    Drug use: No    Sexual activity: Not Currently     Review of Systems   Constitutional: Negative for chills, diaphoresis and fever.   HENT: Negative for congestion, postnasal drip, sinus pressure and sore throat.    Eyes: Negative for visual disturbance.   Respiratory: Negative for cough, chest tightness, shortness of breath and wheezing.    Cardiovascular: Negative for chest pain, palpitations and leg swelling.   Gastrointestinal: Negative for abdominal distention, abdominal pain, blood in stool, constipation, diarrhea, nausea and vomiting.   Endocrine: Negative.    Genitourinary: Negative for dysuria.   Musculoskeletal: Positive for back pain.   Skin: Negative.    Allergic/Immunologic: Negative.    Neurological: Positive for headaches. Negative for dizziness, weakness and numbness.        Fell hitting right side of head on a rock losing conscious for ~ 30 minutes   Hematological: Negative.    Psychiatric/Behavioral: Negative.      Objective:     Vital Signs (Most Recent):  Temp: 98.1 °F (36.7 °C) (17)  Pulse: 76 (17)  Resp: 16 (17)  BP: 132/80 (17)  SpO2: 97 % (17) Vital Signs (24h Range):  Temp:  [97.5 °F (36.4 °C)-98.3 °F (36.8 °C)] 98.1 °F  (36.7 °C)  Pulse:  [76-89] 76  Resp:  [16-18] 16  SpO2:  [94 %-97 %] 97 %  BP: (132-153)/(79-89) 132/80     Weight: 81.6 kg (180 lb)  Body mass index is 31.89 kg/m².    Physical Exam   Constitutional: She is oriented to person, place, and time. She appears well-developed and well-nourished. She is cooperative.  Non-toxic appearance. No distress.   HENT:   Head: Normocephalic and atraumatic.   Eyes: Conjunctivae and lids are normal. Pupils are equal, round, and reactive to light.   Neck: Trachea normal, normal range of motion and full passive range of motion without pain. Neck supple. Normal carotid pulses and no JVD present. No tracheal deviation present. No thyroid mass and no thyromegaly present.   Cardiovascular: Normal rate, regular rhythm, S1 normal, S2 normal, normal heart sounds and normal pulses.    Pulmonary/Chest: Effort normal and breath sounds normal. No stridor.   Abdominal: Soft. Normal appearance and bowel sounds are normal. There is no tenderness.   Musculoskeletal: Normal range of motion.   Neurological: She is alert and oriented to person, place, and time. She has normal strength. No cranial nerve deficit or sensory deficit.   Skin: Skin is warm, dry and intact. She is not diaphoretic. No cyanosis. Nails show no clubbing.   Psychiatric: She has a normal mood and affect. Her speech is normal and behavior is normal. Judgment and thought content normal. Cognition and memory are normal.         CRANIAL NERVES     CN III, IV, VI   Pupils are equal, round, and reactive to light.       Significant Labs:   CBC:   Recent Labs  Lab 12/17/17  1439   WBC 10.99   HGB 14.6   HCT 42.0        CMP:   Recent Labs  Lab 12/17/17  1439      K 4.0      CO2 24   GLU 98   BUN 16   CREATININE 0.7   CALCIUM 9.1   PROT 7.5   ALBUMIN 3.5   BILITOT 0.2   ALKPHOS 101   AST 16   ALT 29   ANIONGAP 9   EGFRNONAA >60     Coagulation:   Recent Labs  Lab 12/17/17  1439   INR 0.9       Significant Imaging:   Imaging  Results          MRI Brain W WO Contrast (Final result)  Result time 12/17/17 19:33:40    Final result by Logan Hopper MD (12/17/17 19:33:40)                 Impression:       1.6 x 0.8 cm T1 hypointense/T2 hyperintense nonenhancing lesion in the frontal horn of the left lateral ventricle with minimal edema of the surrounding periventricular white matter. No obstructive signs. This is most suggestive of a subependymoma.      Changes of chronic small vessel ischemic disease and mild cerebral volume loss.    No acute intracranial process.          Electronically signed by: LOGAN HOPPER MD  Date:     12/17/17  Time:    19:33              Narrative:    Exam: [id number]  12/17/17  17:02:32 [id number] ([facility]) : MRI BRAIN W WO CONTRAST    Technique:    Multiplanar multisequence MRI of the brain was performed without and with intravenous contrast.  The patient received 7.5 cc of gadavist IV.    Comparison:    CT scan of the head dated 12/17/2017.    Findings:      The craniocervical junction is within normal limits.  The intracranial flow voids are unremarkable.    No diffusion-weighted signal abnormality is present.    There is increased T2/FLAIR signal within the periventricular and subcortical white matter.      There is a 1.6 x 0.8 cm T1 hypointense/T2 hyperintense nonenhancing lesion in the frontal horn of the left lateral ventricle.  No evidence of diffusion restriction is identified with this lesion.  The lesion demonstrated T2/FLAIR hyperintensity.  There is minimal edema of the adjacent periventricular white matter and the left caudate head.      The ventricles and sulci otherwise unremarkable.  No extra-axial fluid collections are identified.  There is no evidence of intracranial hemorrhage.    The orbits and intraorbital contents are within normal limits.  The paranasal sinuses and mastoid air cells are clear.    There is no evidence of abnormal enhancement following intravenous contrast administration.                              MRI Cervical Spine W WO Cont (Final result)  Result time 12/17/17 19:33:13    Final result by Logan Hopper MD (12/17/17 19:33:13)                 Impression:       1.  No evidence of metastatic disease or drop metastasis in the cervical spine.    2.  No evidence of ligamentous injury or acute fracture of the cervical spine.    3.  No significant degenerative changes in the cervical spine.            Electronically signed by: LOGAN HOPPER MD  Date:     12/17/17  Time:    19:33              Narrative:    Exam: [id number]  12/17/17  17:03:28 [id number] ([facility]) : MRI CERVICAL SPINE W WO CONTRAST    Technique:    Multiplanar and multisequence MRI of the cervical spine was performed without and with intravenous contrast.  The patient received 7.5 cc of Gadavist IV.    Comparison:    CT scan of the cervical spine dated 12/17/2017    Findings:      The visualized portions of the posterior fossa are within normal limits.  The craniocervical junction is within normal limits.    The cervical alignment is within normal limits.  The vertebral body heights are maintained.  There are scattered hemangiomas in the C2 and C6 vertebral bodies.  The remaining marrow signal is within normal limits.    The cord is normal in signal without evidence of edema or expansion.  There are no intraspinal collections.    There is minimal disc desiccation.  Evaluation of individual disc levels reveal minimal disc protrusions at the C5-C6 and C6-C7 levels.  There is no evidence of spinal canal or neural foraminal narrowing.    The paraspinal soft tissues are unremarkable.     There is no evidence of abnormal enhancement following intravenous contrast enhancement.                             X-Ray Lumbar Spine Ap And Lateral (Final result)  Result time 12/17/17 15:43:09    Final result by Torito Cervantes III, MD (12/17/17 15:43:09)                 Narrative:    There are L5 pars defects with grade 1 anterolisthesis of line 5 on S1 with severe DJD  at that level. This mild DJD of levels above this. No other abnormality seen.      Electronically signed by: BOBBY DIXON MD  Date:     12/17/17  Time:    15:43                              X-Ray Chest PA And Lateral (Final result)  Result time 12/17/17 15:51:02    Final result by Azul Clay MD (12/17/17 15:51:02)                 Impression:     No acute cardiopulmonary disease      Electronically signed by: AZUL CLAY MD  Date:     12/17/17  Time:    15:51              Narrative:    PA and lateral views of the chest were obtained.  Comparison -- none. The heart size is borderline enlarged. Mediastinal contour is unremarkable. Lungs are expanded and clear. No lung consolidation, pleural effusion, pneumothorax is seen. The skeletal structures are intact.                             CT Cervical Spine Without Contrast (Final result)  Result time 12/17/17 14:55:44    Final result by Kristian Woodard MD (12/17/17 14:55:44)                 Impression:        No fracture or traumatic malalignment of the cervical spine.      Electronically signed by: KRISTIAN WOODARD MD  Date:     12/17/17  Time:    14:55              Narrative:    CT cervical spine    12/17/17 14:33:49    Accession# [id number]    CLINICAL INDICATION: 62 year old F with neck pain     TECHNIQUE:   Axial CT images obtained throughout the region of the cervical spine without intravenous contrast. Axial, sagittal, and coronal reconstructions were performed.    COMPARISON: No priors    FINDINGS:     The vertebral bodies are normal in height and morphology without evidence of fracture or osseous destructive process.  Normal sagittal alignment is preserved.    Mild degenerative changes without evidence of bony spinal canal stenosis or high grade neuroforaminal narrowing.  Intervertebral disk heights are well maintained.    Limited evaluation of the intraspinal contents demonstrates no hematoma or mass.Paraspinal soft tissues exhibit no acute  abnormalities.                             CT Head Without Contrast (Final result)     Abnormal  Result time 12/17/17 14:11:19    Final result by Kristian Huang MD (12/17/17 14:11:19)                 Impression:         Extracranial soft tissue swelling without evidence of underlying fracture or acute intracranial hemorrhage.    1.5 cm low-density lesion in the frontal horn the left lateral ventricle. Recommend further evaluation with contrast-enhanced MRI; neoplasm such as subependymoma to be considered.    Epic notification system activated.       Electronically signed by: KRISTIAN HUANG MD  Date:     12/17/17  Time:    14:11              Narrative:    CT head without contrast    12/17/17 13:54:53    Accession# [id number]    CLINICAL INDICATION: 62 year old F with occipital hematoma, fall     TECHNIQUE: Axial CT images obtained throughout the region of the head without the use of intravenous contrast. Axial, sagittal and coronal reconstructions were performed.    COMPARISON: No priors.    FINDINGS:    The ventricles are normal in size without evidence of hydrocephalus. 1.5 x 0.9 cm low density rounded lesion in the frontal horn of left lateral ventricle near the foramen of Perez. No associated ventricular trapping or dilatation. No surrounding edema. No significant mass effect.    The brain appears within normal limits. No parenchymal mass, hemorrhage, edema or major vascular distribution infarct.    No extra-axial blood or fluid collections.    Posterior right parietal extracranial soft tissue swelling. No subjacent fracture. The cranium appears intact. Mastoid air cells and paranasal sinuses are essentially clear.                                Assessment/Plan:     * Post concussive syndrome    See HPI for history and imaging results.  Fall with LOC. Plan for neuro checks. Fall precautions. Neurology consulted. Continue to monitor. PT to eval and treat.              VTE Risk Mitigation         Ordered     Low  Risk of VTE  Once      12/17/17 1844             LUCIO Palacios  Department of Hospital Medicine   [facility]

## 2017-12-18 NOTE — SUBJECTIVE & OBJECTIVE
Past Medical History:   Diagnosis Date    Abdominal hernia     Gastritis     High cholesterol     History of  section        History reviewed. No pertinent surgical history.    Review of patient's allergies indicates:   Allergen Reactions    Penicillins Hives       No current facility-administered medications on file prior to encounter.      No current outpatient prescriptions on file prior to encounter.     Family History     None        Social History Main Topics    Smoking status: Never Smoker    Smokeless tobacco: Never Used    Alcohol use No    Drug use: No    Sexual activity: Not Currently     Review of Systems   Constitutional: Negative for chills, diaphoresis and fever.   HENT: Negative for congestion, postnasal drip, sinus pressure and sore throat.    Eyes: Negative for visual disturbance.   Respiratory: Negative for cough, chest tightness, shortness of breath and wheezing.    Cardiovascular: Negative for chest pain, palpitations and leg swelling.   Gastrointestinal: Negative for abdominal distention, abdominal pain, blood in stool, constipation, diarrhea, nausea and vomiting.   Endocrine: Negative.    Genitourinary: Negative for dysuria.   Musculoskeletal: Positive for back pain.   Skin: Negative.    Allergic/Immunologic: Negative.    Neurological: Positive for headaches. Negative for dizziness, weakness and numbness.        Fell hitting right side of head on a rock losing conscious for ~ 30 minutes   Hematological: Negative.    Psychiatric/Behavioral: Negative.      Objective:     Vital Signs (Most Recent):  Temp: 98.1 °F (36.7 °C) (17)  Pulse: 76 (17)  Resp: 16 (17)  BP: 132/80 (17)  SpO2: 97 % (17) Vital Signs (24h Range):  Temp:  [97.5 °F (36.4 °C)-98.3 °F (36.8 °C)] 98.1 °F (36.7 °C)  Pulse:  [76-89] 76  Resp:  [16-18] 16  SpO2:  [94 %-97 %] 97 %  BP: (132-153)/(79-89) 132/80     Weight: 81.6 kg (180 lb)  Body mass index is 31.89  kg/m².    Physical Exam   Constitutional: She is oriented to person, place, and time. She appears well-developed and well-nourished. She is cooperative.  Non-toxic appearance. No distress.   HENT:   Head: Normocephalic and atraumatic.   Eyes: Conjunctivae and lids are normal. Pupils are equal, round, and reactive to light.   Neck: Trachea normal, normal range of motion and full passive range of motion without pain. Neck supple. Normal carotid pulses and no JVD present. No tracheal deviation present. No thyroid mass and no thyromegaly present.   Cardiovascular: Normal rate, regular rhythm, S1 normal, S2 normal, normal heart sounds and normal pulses.    Pulmonary/Chest: Effort normal and breath sounds normal. No stridor.   Abdominal: Soft. Normal appearance and bowel sounds are normal. There is no tenderness.   Musculoskeletal: Normal range of motion.   Neurological: She is alert and oriented to person, place, and time. She has normal strength. No cranial nerve deficit or sensory deficit.   Skin: Skin is warm, dry and intact. She is not diaphoretic. No cyanosis. Nails show no clubbing.   Psychiatric: She has a normal mood and affect. Her speech is normal and behavior is normal. Judgment and thought content normal. Cognition and memory are normal.         CRANIAL NERVES     CN III, IV, VI   Pupils are equal, round, and reactive to light.       Significant Labs:   CBC:   Recent Labs  Lab 12/17/17  1439   WBC 10.99   HGB 14.6   HCT 42.0        CMP:   Recent Labs  Lab 12/17/17  1439      K 4.0      CO2 24   GLU 98   BUN 16   CREATININE 0.7   CALCIUM 9.1   PROT 7.5   ALBUMIN 3.5   BILITOT 0.2   ALKPHOS 101   AST 16   ALT 29   ANIONGAP 9   EGFRNONAA >60     Coagulation:   Recent Labs  Lab 12/17/17  1439   INR 0.9       Significant Imaging:   Imaging Results          MRI Brain W WO Contrast (Final result)  Result time 12/17/17 19:33:40    Final result by Logan Hopper MD (12/17/17 19:33:40)                  Impression:       1.6 x 0.8 cm T1 hypointense/T2 hyperintense nonenhancing lesion in the frontal horn of the left lateral ventricle with minimal edema of the surrounding periventricular white matter. No obstructive signs. This is most suggestive of a subependymoma.      Changes of chronic small vessel ischemic disease and mild cerebral volume loss.    No acute intracranial process.          Electronically signed by: LOGAN HOPPER MD  Date:     12/17/17  Time:    19:33              Narrative:    Exam: [id number]  12/17/17  17:02:32 [id number] ([facility]) : MRI BRAIN W WO CONTRAST    Technique:    Multiplanar multisequence MRI of the brain was performed without and with intravenous contrast.  The patient received 7.5 cc of gadavist IV.    Comparison:    CT scan of the head dated 12/17/2017.    Findings:      The craniocervical junction is within normal limits.  The intracranial flow voids are unremarkable.    No diffusion-weighted signal abnormality is present.    There is increased T2/FLAIR signal within the periventricular and subcortical white matter.      There is a 1.6 x 0.8 cm T1 hypointense/T2 hyperintense nonenhancing lesion in the frontal horn of the left lateral ventricle.  No evidence of diffusion restriction is identified with this lesion.  The lesion demonstrated T2/FLAIR hyperintensity.  There is minimal edema of the adjacent periventricular white matter and the left caudate head.      The ventricles and sulci otherwise unremarkable.  No extra-axial fluid collections are identified.  There is no evidence of intracranial hemorrhage.    The orbits and intraorbital contents are within normal limits.  The paranasal sinuses and mastoid air cells are clear.    There is no evidence of abnormal enhancement following intravenous contrast administration.                             MRI Cervical Spine W WO Cont (Final result)  Result time 12/17/17 19:33:13    Final result by Logan Hopper MD (12/17/17 19:33:13)                  Impression:       1.  No evidence of metastatic disease or drop metastasis in the cervical spine.    2.  No evidence of ligamentous injury or acute fracture of the cervical spine.    3.  No significant degenerative changes in the cervical spine.            Electronically signed by: DALY HERRERA MD  Date:     12/17/17  Time:    19:33              Narrative:    Exam: [id number]  12/17/17  17:03:28 [id number] ([facility]) : MRI CERVICAL SPINE W WO CONTRAST    Technique:    Multiplanar and multisequence MRI of the cervical spine was performed without and with intravenous contrast.  The patient received 7.5 cc of Gadavist IV.    Comparison:    CT scan of the cervical spine dated 12/17/2017    Findings:      The visualized portions of the posterior fossa are within normal limits.  The craniocervical junction is within normal limits.    The cervical alignment is within normal limits.  The vertebral body heights are maintained.  There are scattered hemangiomas in the C2 and C6 vertebral bodies.  The remaining marrow signal is within normal limits.    The cord is normal in signal without evidence of edema or expansion.  There are no intraspinal collections.    There is minimal disc desiccation.  Evaluation of individual disc levels reveal minimal disc protrusions at the C5-C6 and C6-C7 levels.  There is no evidence of spinal canal or neural foraminal narrowing.    The paraspinal soft tissues are unremarkable.     There is no evidence of abnormal enhancement following intravenous contrast enhancement.                             X-Ray Lumbar Spine Ap And Lateral (Final result)  Result time 12/17/17 15:43:09    Final result by Bobby Dixon III, MD (12/17/17 15:43:09)                 Narrative:    There are L5 pars defects with grade 1 anterolisthesis of line 5 on S1 with severe DJD at that level. This mild DJD of levels above this. No other abnormality seen.      Electronically signed by: BOBBY DIXON MD  Date:      12/17/17  Time:    15:43                              X-Ray Chest PA And Lateral (Final result)  Result time 12/17/17 15:51:02    Final result by Alex Clay MD (12/17/17 15:51:02)                 Impression:     No acute cardiopulmonary disease      Electronically signed by: ALEX CLAY MD  Date:     12/17/17  Time:    15:51              Narrative:    PA and lateral views of the chest were obtained.  Comparison -- none. The heart size is borderline enlarged. Mediastinal contour is unremarkable. Lungs are expanded and clear. No lung consolidation, pleural effusion, pneumothorax is seen. The skeletal structures are intact.                             CT Cervical Spine Without Contrast (Final result)  Result time 12/17/17 14:55:44    Final result by Kristian Huang MD (12/17/17 14:55:44)                 Impression:        No fracture or traumatic malalignment of the cervical spine.      Electronically signed by: KRISTIAN HUANG MD  Date:     12/17/17  Time:    14:55              Narrative:    CT cervical spine    12/17/17 14:33:49    Accession# [id number]    CLINICAL INDICATION: 62 year old F with neck pain     TECHNIQUE:   Axial CT images obtained throughout the region of the cervical spine without intravenous contrast. Axial, sagittal, and coronal reconstructions were performed.    COMPARISON: No priors    FINDINGS:     The vertebral bodies are normal in height and morphology without evidence of fracture or osseous destructive process.  Normal sagittal alignment is preserved.    Mild degenerative changes without evidence of bony spinal canal stenosis or high grade neuroforaminal narrowing.  Intervertebral disk heights are well maintained.    Limited evaluation of the intraspinal contents demonstrates no hematoma or mass.Paraspinal soft tissues exhibit no acute abnormalities.                             CT Head Without Contrast (Final result)     Abnormal  Result time 12/17/17 14:11:19    Final  result by Kristian Huang MD (12/17/17 14:11:19)                 Impression:         Extracranial soft tissue swelling without evidence of underlying fracture or acute intracranial hemorrhage.    1.5 cm low-density lesion in the frontal horn the left lateral ventricle. Recommend further evaluation with contrast-enhanced MRI; neoplasm such as subependymoma to be considered.    Epic notification system activated.       Electronically signed by: KRISTIAN HUANG MD  Date:     12/17/17  Time:    14:11              Narrative:    CT head without contrast    12/17/17 13:54:53    Accession# [id number]    CLINICAL INDICATION: 62 year old F with occipital hematoma, fall     TECHNIQUE: Axial CT images obtained throughout the region of the head without the use of intravenous contrast. Axial, sagittal and coronal reconstructions were performed.    COMPARISON: No priors.    FINDINGS:    The ventricles are normal in size without evidence of hydrocephalus. 1.5 x 0.9 cm low density rounded lesion in the frontal horn of left lateral ventricle near the foramen of Perez. No associated ventricular trapping or dilatation. No surrounding edema. No significant mass effect.    The brain appears within normal limits. No parenchymal mass, hemorrhage, edema or major vascular distribution infarct.    No extra-axial blood or fluid collections.    Posterior right parietal extracranial soft tissue swelling. No subjacent fracture. The cranium appears intact. Mastoid air cells and paranasal sinuses are essentially clear.

## 2022-04-01 PROCEDURE — 96365 THER/PROPH/DIAG IV INF INIT: CPT

## 2022-04-01 PROCEDURE — 99285 EMERGENCY DEPT VISIT HI MDM: CPT | Mod: 25

## 2022-04-01 PROCEDURE — 96375 TX/PRO/DX INJ NEW DRUG ADDON: CPT

## 2022-04-01 PROCEDURE — 69210 REMOVE IMPACTED EAR WAX UNI: CPT | Mod: 50

## 2022-04-02 ENCOUNTER — HOSPITAL ENCOUNTER (EMERGENCY)
Facility: HOSPITAL | Age: 67
Discharge: HOME | End: 2022-04-02
Attending: EMERGENCY MEDICINE

## 2022-04-02 VITALS
DIASTOLIC BLOOD PRESSURE: 85 MMHG | OXYGEN SATURATION: 96 % | WEIGHT: 180 LBS | TEMPERATURE: 98 F | HEIGHT: 65 IN | SYSTOLIC BLOOD PRESSURE: 122 MMHG | BODY MASS INDEX: 29.99 KG/M2 | HEART RATE: 69 BPM | RESPIRATION RATE: 15 BRPM

## 2022-04-02 DIAGNOSIS — H61.23 BILATERAL IMPACTED CERUMEN: ICD-10-CM

## 2022-04-02 DIAGNOSIS — R68.89 NOT FEELING GREAT: ICD-10-CM

## 2022-04-02 DIAGNOSIS — F43.9 STRESS: ICD-10-CM

## 2022-04-02 DIAGNOSIS — M79.605 PAIN OF LEFT LOWER EXTREMITY: ICD-10-CM

## 2022-04-02 DIAGNOSIS — R53.1 WEAKNESS: ICD-10-CM

## 2022-04-02 DIAGNOSIS — G44.209 TENSION-TYPE HEADACHE, NOT INTRACTABLE, UNSPECIFIED CHRONICITY PATTERN: Primary | ICD-10-CM

## 2022-04-02 LAB
ALBUMIN SERPL BCP-MCNC: 3.8 G/DL (ref 3.5–5.2)
ALP SERPL-CCNC: 100 U/L (ref 55–135)
ALT SERPL W/O P-5'-P-CCNC: 28 U/L (ref 10–44)
ANION GAP SERPL CALC-SCNC: 10 MMOL/L (ref 8–16)
AST SERPL-CCNC: 21 U/L (ref 10–40)
BASOPHILS # BLD AUTO: 0.08 K/UL (ref 0–0.2)
BASOPHILS NFR BLD: 0.6 % (ref 0–1.9)
BILIRUB SERPL-MCNC: 0.2 MG/DL (ref 0.1–1)
BUN SERPL-MCNC: 22 MG/DL (ref 8–23)
CALCIUM SERPL-MCNC: 8.3 MG/DL (ref 8.7–10.5)
CHLORIDE SERPL-SCNC: 110 MMOL/L (ref 95–110)
CO2 SERPL-SCNC: 19 MMOL/L (ref 23–29)
CREAT SERPL-MCNC: 0.8 MG/DL (ref 0.5–1.4)
DIFFERENTIAL METHOD: ABNORMAL
EOSINOPHIL # BLD AUTO: 0.2 K/UL (ref 0–0.5)
EOSINOPHIL NFR BLD: 1.4 % (ref 0–8)
ERYTHROCYTE [DISTWIDTH] IN BLOOD BY AUTOMATED COUNT: 13.2 % (ref 11.5–14.5)
EST. GFR  (AFRICAN AMERICAN): >60 ML/MIN/1.73 M^2
EST. GFR  (NON AFRICAN AMERICAN): >60 ML/MIN/1.73 M^2
GLUCOSE SERPL-MCNC: 98 MG/DL (ref 70–110)
HCT VFR BLD AUTO: 40.7 % (ref 37–48.5)
HGB BLD-MCNC: 13.3 G/DL (ref 12–16)
IMM GRANULOCYTES # BLD AUTO: 0.07 K/UL (ref 0–0.04)
IMM GRANULOCYTES NFR BLD AUTO: 0.5 % (ref 0–0.5)
LIPASE SERPL-CCNC: 34 U/L (ref 4–60)
LYMPHOCYTES # BLD AUTO: 4 K/UL (ref 1–4.8)
LYMPHOCYTES NFR BLD: 30.4 % (ref 18–48)
MAGNESIUM SERPL-MCNC: 2.1 MG/DL (ref 1.6–2.6)
MCH RBC QN AUTO: 29.6 PG (ref 27–31)
MCHC RBC AUTO-ENTMCNC: 32.7 G/DL (ref 32–36)
MCV RBC AUTO: 90 FL (ref 82–98)
MONOCYTES # BLD AUTO: 1.1 K/UL (ref 0.3–1)
MONOCYTES NFR BLD: 7.9 % (ref 4–15)
NEUTROPHILS # BLD AUTO: 7.8 K/UL (ref 1.8–7.7)
NEUTROPHILS NFR BLD: 59.2 % (ref 38–73)
NRBC BLD-RTO: 0 /100 WBC
PLATELET # BLD AUTO: 305 K/UL (ref 150–450)
PMV BLD AUTO: 10.2 FL (ref 9.2–12.9)
POTASSIUM SERPL-SCNC: 4.4 MMOL/L (ref 3.5–5.1)
PROT SERPL-MCNC: 7.2 G/DL (ref 6–8.4)
RBC # BLD AUTO: 4.5 M/UL (ref 4–5.4)
SODIUM SERPL-SCNC: 139 MMOL/L (ref 136–145)
WBC # BLD AUTO: 13.21 K/UL (ref 3.9–12.7)

## 2022-04-02 PROCEDURE — 93010 ELECTROCARDIOGRAM REPORT: CPT | Mod: ,,, | Performed by: INTERNAL MEDICINE

## 2022-04-02 PROCEDURE — 25000003 PHARM REV CODE 250: Performed by: EMERGENCY MEDICINE

## 2022-04-02 PROCEDURE — 83690 ASSAY OF LIPASE: CPT | Performed by: EMERGENCY MEDICINE

## 2022-04-02 PROCEDURE — 63600175 PHARM REV CODE 636 W HCPCS: Performed by: EMERGENCY MEDICINE

## 2022-04-02 PROCEDURE — 93010 EKG 12-LEAD: ICD-10-PCS | Mod: ,,, | Performed by: INTERNAL MEDICINE

## 2022-04-02 PROCEDURE — 85025 COMPLETE CBC W/AUTO DIFF WBC: CPT | Performed by: EMERGENCY MEDICINE

## 2022-04-02 PROCEDURE — 83735 ASSAY OF MAGNESIUM: CPT | Performed by: EMERGENCY MEDICINE

## 2022-04-02 PROCEDURE — 80053 COMPREHEN METABOLIC PANEL: CPT | Performed by: EMERGENCY MEDICINE

## 2022-04-02 PROCEDURE — 93005 ELECTROCARDIOGRAM TRACING: CPT

## 2022-04-02 RX ORDER — KETOROLAC TROMETHAMINE 30 MG/ML
10 INJECTION, SOLUTION INTRAMUSCULAR; INTRAVENOUS
Status: COMPLETED | OUTPATIENT
Start: 2022-04-02 | End: 2022-04-02

## 2022-04-02 RX ORDER — ACETAMINOPHEN 500 MG
1000 TABLET ORAL
Status: COMPLETED | OUTPATIENT
Start: 2022-04-02 | End: 2022-04-02

## 2022-04-02 RX ORDER — BUTALBITAL, ACETAMINOPHEN AND CAFFEINE 50; 325; 40 MG/1; MG/1; MG/1
1 TABLET ORAL EVERY 4 HOURS PRN
Qty: 20 TABLET | Refills: 0 | Status: SHIPPED | OUTPATIENT
Start: 2022-04-02 | End: 2022-05-02

## 2022-04-02 RX ORDER — DIPHENHYDRAMINE HYDROCHLORIDE 50 MG/ML
12.5 INJECTION INTRAMUSCULAR; INTRAVENOUS
Status: COMPLETED | OUTPATIENT
Start: 2022-04-02 | End: 2022-04-02

## 2022-04-02 RX ADMIN — DIPHENHYDRAMINE HYDROCHLORIDE 12.5 MG: 50 INJECTION, SOLUTION INTRAMUSCULAR; INTRAVENOUS at 03:04

## 2022-04-02 RX ADMIN — ACETAMINOPHEN 1000 MG: 500 TABLET ORAL at 03:04

## 2022-04-02 RX ADMIN — KETOROLAC TROMETHAMINE 10 MG: 30 INJECTION, SOLUTION INTRAMUSCULAR at 01:04

## 2022-04-02 RX ADMIN — PROMETHAZINE HYDROCHLORIDE 12.5 MG: 25 INJECTION INTRAMUSCULAR; INTRAVENOUS at 03:04

## 2022-04-02 NOTE — ED NOTES
Pt ambulatory to room without incident or difficulty. Pt c/o headache after an argument. Pt denies CP, SOB, ABD pain, weakness, dizziness or visual disturbances. +A/O x 4 w/ ABCs intact, NAD. VSS. Interview performed via . Ed workup and orders in progress. Pt SR up x 2. Bed in lowest position with wheels locked. Call bell within reach of pt.       Review of patient's allergies indicates:  No Active Allergies     Patient has verified the spelling of their name and  on armband.   APPEARANCE: Patient is alert, calm, oriented x 4, and does not appear distressed.  SKIN: Skin is normal for race, warm, and dry. Normal skin turgor and mucous membranes moist.  CARDIAC: Normal rate and rhythm, no murmur heard.   RESPIRATORY:Normal rate and effort. Breath sounds clear bilaterally throughout chest. Respirations are equal and unlabored.    GASTRO: Bowel sounds normal, abdomen is soft, no tenderness, and no abdominal distention.  MUSCLE: Full ROM. No bony tenderness or soft tissue tenderness. No obvious deformity.  PERIPHERAL VASCULAR: peripheral pulses present. Normal cap refill. No edema. Warm to touch.  NEURO: 5/5 strength major flexors/extensors bilaterally. Sensory intact to light touch bilaterally. Ridgeley coma scale: eyes open spontaneously-4, oriented & converses-5, obeys commands-6. No neurological abnormalities.   MENTAL STATUS: awake, alert and aware of environment.  EYE: No overt deficits noted. No drainage. Sclera WNL  ENT: EARS: no obvious drainage. NOSE: no active bleeding. THROAT: no redness or swelling  : Voids without complication

## 2022-04-02 NOTE — DISCHARGE INSTRUCTIONS
Mrs. Evangelista Andrews,    Thank you for letting me care for you today! It was nice meeting you, and I hope you feel better soon.   If you would like access to your chart and what was done today please utilize the [facility].   Please come back to [facility] for all of your future medical needs.    Our goal in the emergency department is to always give you outstanding care and exceptional service. You may receive a survey by mail or e-mail in the next week regarding your experience in our ED. We would greatly appreciate you completing and returning the survey. Your feedback provides us with a way to recognize our staff who give very good care and it helps us learn how to improve when your experience was below our aspiration of excellence.     Sincerely,    Rhett Delatorre MD  Board Certified Emergency Physician

## 2022-04-02 NOTE — ED NOTES
Pt states she feels heavy and when she closes her eyes, she sees children running around. Pain has decreased. MD aware, states to wait 30 minutes before discharge.

## 2022-04-02 NOTE — ED PROVIDER NOTES
"Encounter Date: 2022       History     Chief Complaint   Patient presents with    Headache    Numbness     Pt c/o headache x 1 day w/ right sided neck and shoulder numbness. Pt reports feeling weak. Bilateral  strength strong and equal. No drift noted. Pt denies changes in vision. Pt ambulatory w/ steady gait. No facial droop noted. Pt states, "Last time I was in the hospital, they told me to come back right away if I get a bad headache because there is something like a coin in my brain."      This is a 67-year-old female with a past medical history significant for a brain mass of unknown significance, arthritis, constipation, UTIs as well as headaches who presents for evaluation of an intense headache over last 1 day as well as some neck and shoulder tightness which developed after her daughter told her something she did not want to here and she felt worthless with intense crying thereafter which she thought may have been on attack of nerves.  The pain has persisted despite her use of ibuprofen and over-the-counter headache medication at home which is what prompted her to come to the emergency department tonight.  She denies fevers, chills, she does endorse some difficulty with hearing and a sensation of fullness in the years.  She had previously lived in [place] and notes that when she was in [place] she had been told that she had nerve attacks as well as ear wax which required the use of drops chronically.  She has been unable to obtain regular follow-up in the [place].        Review of patient's allergies indicates:  No Active Allergies  Past Medical History:   Diagnosis Date    Abdominal hernia     Arthritis     Constipation     Gastritis     High cholesterol     History of  section     UTI (urinary tract infection)      Past Surgical History:   Procedure Laterality Date    TUBAL LIGATION       No family history on file.  Social History     Tobacco Use    Smoking status: Never " Smoker    Smokeless tobacco: Never Used   Substance Use Topics    Alcohol use: No    Drug use: No     Review of Systems  Constitutional-no fever  HEENT-positive ear fullness  Eyes-no redness  Respiratory-no shortness of breath  Cardio-no chest pain  GI-no abdominal pain  Endocrine-no cold intolerance  -no difficulty urinating  MSK-positive myalgias  Skin-no rashes  Allergy-no environmental allergy  Neurologic-positive headache  Hematology-no swollen nodes  Behavioral-no confusion  Physical Exam     Initial Vitals [04/01/22 2233]   BP Pulse Resp Temp SpO2   (!) 173/106 91 20 98.5 °F (36.9 °C) 97 %      MAP       --         Physical Exam  Constitutional:  Chronically ill-appearing 67-year-old female in moderate distress  Eyes: Conjunctivae normal.  Extraocular movements intact, pupils 3 mm briskly reactive and symmetric  ENT       Head: Normocephalic, atraumatic.       Nose: Normal external appearance        Mouth/Throat: no strigulous respirations   Ears-bilateral auditory canals are occluded with cerumen  Hematological/Lymphatic/Immunilogical: no visible lymphadenopathy   Cardiovascular: Normal rate,   Respiratory: Normal respiratory effort.   Gastrointestinal: non distended   Musculoskeletal: Normal range of motion in all extremities. No obvious deformities or swelling.  Neurologic:  NIH 0  GCS- 15  CN2-12 intact  5/5 strength all 4 extremities  Normal gait  Negative rhomberg  No appreciable drift  Skin: Skin is warm, dry. No rash noted.  Psychiatric: Mood and affect are normal.   ED Course   Ear Wax Removal    Date/Time: 4/2/2022 5:18 AM  Performed by: Rhett Delatorre MD  Authorized by: Rhett Delatorre MD     Anesthesia:  Local Anesthetic: none  Location details: both ears  Procedure type: curette Cerumen Removal Results: Cerumen partially removed.  Patient tolerance: Patient tolerated the procedure well with no immediate complications        Labs Reviewed   CBC W/ AUTO DIFFERENTIAL - Abnormal;  Notable for the following components:       Result Value    WBC 13.21 (*)     Gran # (ANC) 7.8 (*)     Immature Grans (Abs) 0.07 (*)     Mono # 1.1 (*)     All other components within normal limits   COMPREHENSIVE METABOLIC PANEL - Abnormal; Notable for the following components:    CO2 19 (*)     Calcium 8.3 (*)     All other components within normal limits   LIPASE   MAGNESIUM   URINALYSIS, REFLEX TO URINE CULTURE          Imaging Results          CT Head Without Contrast (Final result)  Result time 04/02/22 01:28:09    Final result by George Long MD (04/02/22 01:28:09)                 Impression:      No CT evidence of acute intracranial abnormality. Clinical correlation and further evaluation as warranted.    Relatively stable appearance of previously identified lesion with the left lateral ventricle without evidence to suggest hydrocephalus.      Electronically signed by: George Long MD  Date:    04/02/2022  Time:    01:28             Narrative:    EXAMINATION:  CT HEAD WITHOUT CONTRAST    CLINICAL HISTORY:  Headache, new or worsening (Age >= 50y);    TECHNIQUE:  Low dose axial images were obtained through the head.  Coronal and sagittal reformations were also performed. Contrast was not administered.    COMPARISON:  CT head 12/17/2017, MRI brain 12/17/2017    FINDINGS:  There is no CT evidence of acute intracranial hemorrhage or midline shift.  Gray-white matter differentiation appears maintained.  There is mild patchy periventricular white matter hypoattenuation which may relate to sequelae of chronic microvascular ischemic change.  There is redemonstration of an approximately 1.5 cm low-attenuation lesion within the left lateral ventricle.  This appears relatively stable in size and configuration.  The ventricular system is stable without evidence to suggest hydrocephalus.  The mastoid air cells and paranasal sinuses are clear of acute process. The visualized bones of the calvarium demonstrate no  acute osseous abnormality.                                 Medications   ketorolac injection 9.999 mg (9.999 mg Intravenous Given 4/2/22 0100)   promethazine (PHENERGAN) 12.5 mg in dextrose 5 % 50 mL IVPB (0 mg Intravenous Stopped 4/2/22 0400)   diphenhydrAMINE injection 12.5 mg (12.5 mg Intravenous Given 4/2/22 0324)   acetaminophen tablet 1,000 mg (1,000 mg Oral Given 4/2/22 0325)     Medical Decision Making:   History:   I obtained history from: someone other than patient.       <> Summary of History: Daughter at the bedside supplements history and physical  Old Medical Records: I decided to obtain old medical records.  Old Records Summarized: records from clinic visits and records from previous admission(s).  Differential Diagnosis:   Headache represents a broad differential with many sinister and benign causes.  Consideration of such causes and a workup with regard to such causes as - Subarachnoid, meningitis, hypertensive urgency and emergency, skull fractures, malignancies, tension headache migraine among a myriad of other causes.  There is evidence throughout literature that a negative head CT within 6 hr of onset of headache for subarachnoid hemorrhage is greater than 99% sensitive for this diagnosis.  Because headaches offer a broad differential and a wide range of severity from mild discomfort to debilitating or life-threatening a discussion was undertaken with the patient regarding the risks and benefits of investigation including but not limited to imaging, lumbar puncture, referral for specialty evaluation, and admission.  The disposition reflects the likely cause of the headache and a discussion of next steps in evaluation were discussed with patient.   Clinical Tests:   Lab Tests: Ordered and Reviewed  Radiological Study: Ordered and Reviewed  ED Management:  This woman had a nonfocal neurologic examination with tightness around the entire head and a significant stress at the onset of her symptoms.   Seems likely that she has a tension headache given her reassuring imaging and unrevealing laboratory evaluation.  In addition she go to the fullness in the ears and her cerumen was thick and occlusive bilaterally.  Subsequently removed cerumen.  Discussed at length the importance of outpatient follow-up, the need for primary care follow-up.                      Clinical Impression:   Final diagnoses:  [R53.1] Weakness  [G44.209] Tension-type headache, not intractable, unspecified chronicity pattern (Primary)  [H61.23] Bilateral impacted cerumen  [M79.605] Pain of left lower extremity  [F43.9] Stress  [R68.89] Not feeling great          ED Disposition Condition    Discharge Stable        ED Prescriptions     Medication Sig Dispense Start Date End Date Auth. Provider    butalbital-acetaminophen-caffeine -40 mg (FIORICET, ESGIC) -40 mg per tablet Take 1 tablet by mouth every 4 (four) hours as needed for Pain or Headaches. 20 tablet 4/2/2022 5/2/2022 Rhett Delatorre MD    carbamide peroxide (DEBROX) 6.5 % otic solution Place 5 drops into both ears as needed (ear wax). 30 mL 4/2/2022  Rhett Delatorre MD        Follow-up Information     Follow up With Specialties Details Why Contact Info    [facility] - Emergency Dept Emergency Medicine Go to  As needed [address] [place]  [facility] [id number] [phone]           Rhett Delatorre MD  04/02/22 0534

## 2022-04-02 NOTE — ED NOTES
No change in complaint. Pain decreased. VSS. Awaiting further. Pt SR up x 2. Bed in lowest position with wheels locked. Call bell within reach of pt.